# Patient Record
Sex: FEMALE | Race: BLACK OR AFRICAN AMERICAN | Employment: PART TIME | URBAN - METROPOLITAN AREA
[De-identification: names, ages, dates, MRNs, and addresses within clinical notes are randomized per-mention and may not be internally consistent; named-entity substitution may affect disease eponyms.]

---

## 2022-11-21 ENCOUNTER — OFFICE VISIT (OUTPATIENT)
Dept: PRIMARY CARE CLINIC | Age: 27
End: 2022-11-21
Payer: COMMERCIAL

## 2022-11-21 VITALS
SYSTOLIC BLOOD PRESSURE: 122 MMHG | HEIGHT: 69 IN | DIASTOLIC BLOOD PRESSURE: 74 MMHG | HEART RATE: 109 BPM | BODY MASS INDEX: 30.42 KG/M2 | WEIGHT: 205.4 LBS | OXYGEN SATURATION: 99 %

## 2022-11-21 DIAGNOSIS — Z20.2 POSSIBLE EXPOSURE TO STD: ICD-10-CM

## 2022-11-21 DIAGNOSIS — Z00.00 ANNUAL PHYSICAL EXAM: Primary | ICD-10-CM

## 2022-11-21 DIAGNOSIS — L68.0 HIRSUTISM: ICD-10-CM

## 2022-11-21 DIAGNOSIS — Z72.51 UNPROTECTED SEX: ICD-10-CM

## 2022-11-21 DIAGNOSIS — E55.9 VITAMIN D DEFICIENCY: ICD-10-CM

## 2022-11-21 DIAGNOSIS — F32.A DEPRESSION, UNSPECIFIED DEPRESSION TYPE: ICD-10-CM

## 2022-11-21 DIAGNOSIS — N89.8 VAGINAL DISCHARGE: ICD-10-CM

## 2022-11-21 DIAGNOSIS — B96.89 BACTERIAL VAGINITIS: ICD-10-CM

## 2022-11-21 DIAGNOSIS — G43.019 INTRACTABLE MIGRAINE WITHOUT AURA AND WITHOUT STATUS MIGRAINOSUS: ICD-10-CM

## 2022-11-21 DIAGNOSIS — N76.0 BACTERIAL VAGINITIS: ICD-10-CM

## 2022-11-21 DIAGNOSIS — Z00.00 ANNUAL PHYSICAL EXAM: ICD-10-CM

## 2022-11-21 DIAGNOSIS — F12.90 MARIJUANA SMOKER: ICD-10-CM

## 2022-11-21 DIAGNOSIS — B37.31 VAGINAL CANDIDIASIS: ICD-10-CM

## 2022-11-21 PROBLEM — G43.009 MIGRAINE WITHOUT AURA: Status: ACTIVE | Noted: 2022-11-21

## 2022-11-21 PROBLEM — E66.09 OBESITY DUE TO EXCESS CALORIES: Status: RESOLVED | Noted: 2017-01-04 | Resolved: 2022-11-21

## 2022-11-21 PROBLEM — E66.09 OBESITY DUE TO EXCESS CALORIES: Status: ACTIVE | Noted: 2017-01-04

## 2022-11-21 LAB
A/G RATIO: 1.7 (ref 1.1–2.2)
ALBUMIN SERPL-MCNC: 4.4 G/DL (ref 3.4–5)
ALP BLD-CCNC: 70 U/L (ref 40–129)
ALT SERPL-CCNC: 8 U/L (ref 10–40)
ANION GAP SERPL CALCULATED.3IONS-SCNC: 14 MMOL/L (ref 3–16)
AST SERPL-CCNC: 14 U/L (ref 15–37)
BASOPHILS ABSOLUTE: 0 K/UL (ref 0–0.2)
BASOPHILS RELATIVE PERCENT: 0.5 %
BILIRUB SERPL-MCNC: 0.8 MG/DL (ref 0–1)
BUN BLDV-MCNC: 10 MG/DL (ref 7–20)
CALCIUM SERPL-MCNC: 9.7 MG/DL (ref 8.3–10.6)
CHLORIDE BLD-SCNC: 103 MMOL/L (ref 99–110)
CHOLESTEROL, TOTAL: 156 MG/DL (ref 0–199)
CO2: 21 MMOL/L (ref 21–32)
CREAT SERPL-MCNC: 0.6 MG/DL (ref 0.6–1.1)
EOSINOPHILS ABSOLUTE: 0 K/UL (ref 0–0.6)
EOSINOPHILS RELATIVE PERCENT: 0.4 %
ESTRADIOL LEVEL: 124 PG/ML
GFR SERPL CREATININE-BSD FRML MDRD: >60 ML/MIN/{1.73_M2}
GLUCOSE BLD-MCNC: 79 MG/DL (ref 70–99)
GONADOTROPIN, CHORIONIC (HCG) QUANT: <5 MIU/ML
HAV IGM SER IA-ACNC: NORMAL
HCT VFR BLD CALC: 38.2 % (ref 36–48)
HDLC SERPL-MCNC: 47 MG/DL (ref 40–60)
HEMOGLOBIN: 12.4 G/DL (ref 12–16)
HEPATITIS B CORE IGM ANTIBODY: NORMAL
HEPATITIS B SURFACE ANTIGEN INTERPRETATION: NORMAL
HEPATITIS C ANTIBODY INTERPRETATION: NORMAL
LDL CHOLESTEROL CALCULATED: 99 MG/DL
LYMPHOCYTES ABSOLUTE: 1.9 K/UL (ref 1–5.1)
LYMPHOCYTES RELATIVE PERCENT: 22.5 %
MCH RBC QN AUTO: 27 PG (ref 26–34)
MCHC RBC AUTO-ENTMCNC: 32.6 G/DL (ref 31–36)
MCV RBC AUTO: 83 FL (ref 80–100)
MONOCYTES ABSOLUTE: 0.4 K/UL (ref 0–1.3)
MONOCYTES RELATIVE PERCENT: 4.9 %
NEUTROPHILS ABSOLUTE: 6.2 K/UL (ref 1.7–7.7)
NEUTROPHILS RELATIVE PERCENT: 71.7 %
PDW BLD-RTO: 13.8 % (ref 12.4–15.4)
PLATELET # BLD: 252 K/UL (ref 135–450)
PMV BLD AUTO: 9.5 FL (ref 5–10.5)
POTASSIUM SERPL-SCNC: 3.8 MMOL/L (ref 3.5–5.1)
RBC # BLD: 4.6 M/UL (ref 4–5.2)
SODIUM BLD-SCNC: 138 MMOL/L (ref 136–145)
TOTAL PROTEIN: 7 G/DL (ref 6.4–8.2)
TRIGL SERPL-MCNC: 50 MG/DL (ref 0–150)
TSH REFLEX FT4: 0.93 UIU/ML (ref 0.27–4.2)
VITAMIN D 25-HYDROXY: 26.2 NG/ML
VLDLC SERPL CALC-MCNC: 10 MG/DL
WBC # BLD: 8.6 K/UL (ref 4–11)

## 2022-11-21 PROCEDURE — 99385 PREV VISIT NEW AGE 18-39: CPT | Performed by: FAMILY MEDICINE

## 2022-11-21 RX ORDER — ACETAMINOPHEN 325 MG/1
TABLET ORAL
COMMUNITY
End: 2022-11-21

## 2022-11-21 RX ORDER — FLUCONAZOLE 150 MG/1
150 TABLET ORAL
COMMUNITY
End: 2022-11-21

## 2022-11-21 RX ORDER — ERGOCALCIFEROL (VITAMIN D2) 1250 MCG
50000 CAPSULE ORAL WEEKLY
COMMUNITY
Start: 2020-10-16 | End: 2022-11-21

## 2022-11-21 RX ORDER — AMITRIPTYLINE HYDROCHLORIDE 25 MG/1
25 TABLET, FILM COATED ORAL
COMMUNITY
Start: 2017-01-04 | End: 2022-11-21

## 2022-11-21 RX ORDER — AMOXICILLIN AND CLAVULANATE POTASSIUM 875; 125 MG/1; MG/1
125 TABLET, FILM COATED ORAL
COMMUNITY
Start: 2022-09-30 | End: 2022-11-21

## 2022-11-21 RX ORDER — DROSPIRENONE AND ETHINYL ESTRADIOL 0.02-3(28)
3 KIT ORAL
COMMUNITY
End: 2022-11-21

## 2022-11-21 RX ORDER — NAPROXEN 500 MG/1
500 TABLET ORAL
COMMUNITY
End: 2022-11-21

## 2022-11-21 RX ORDER — METRONIDAZOLE 500 MG/1
500 TABLET ORAL
COMMUNITY
Start: 2022-08-26 | End: 2022-11-21

## 2022-11-21 RX ORDER — AMOXICILLIN AND CLAVULANATE POTASSIUM 875; 125 MG/1; MG/1
1 TABLET, FILM COATED ORAL 2 TIMES DAILY
COMMUNITY
End: 2022-11-21

## 2022-11-21 RX ORDER — IBUPROFEN 800 MG/1
800 TABLET ORAL EVERY 8 HOURS PRN
Qty: 30 TABLET | Refills: 2 | Status: SHIPPED | OUTPATIENT
Start: 2022-11-21

## 2022-11-21 RX ORDER — AMOXICILLIN 500 MG/1
500 CAPSULE ORAL
COMMUNITY
Start: 2022-10-25 | End: 2022-11-21

## 2022-11-21 SDOH — ECONOMIC STABILITY: FOOD INSECURITY: WITHIN THE PAST 12 MONTHS, YOU WORRIED THAT YOUR FOOD WOULD RUN OUT BEFORE YOU GOT MONEY TO BUY MORE.: SOMETIMES TRUE

## 2022-11-21 SDOH — ECONOMIC STABILITY: FOOD INSECURITY: WITHIN THE PAST 12 MONTHS, THE FOOD YOU BOUGHT JUST DIDN'T LAST AND YOU DIDN'T HAVE MONEY TO GET MORE.: SOMETIMES TRUE

## 2022-11-21 ASSESSMENT — PATIENT HEALTH QUESTIONNAIRE - PHQ9
2. FEELING DOWN, DEPRESSED OR HOPELESS: 3
8. MOVING OR SPEAKING SO SLOWLY THAT OTHER PEOPLE COULD HAVE NOTICED. OR THE OPPOSITE, BEING SO FIGETY OR RESTLESS THAT YOU HAVE BEEN MOVING AROUND A LOT MORE THAN USUAL: 3
10. IF YOU CHECKED OFF ANY PROBLEMS, HOW DIFFICULT HAVE THESE PROBLEMS MADE IT FOR YOU TO DO YOUR WORK, TAKE CARE OF THINGS AT HOME, OR GET ALONG WITH OTHER PEOPLE: 1
1. LITTLE INTEREST OR PLEASURE IN DOING THINGS: 3
9. THOUGHTS THAT YOU WOULD BE BETTER OFF DEAD, OR OF HURTING YOURSELF: 1
7. TROUBLE CONCENTRATING ON THINGS, SUCH AS READING THE NEWSPAPER OR WATCHING TELEVISION: 3
SUM OF ALL RESPONSES TO PHQ QUESTIONS 1-9: 23
5. POOR APPETITE OR OVEREATING: 3
4. FEELING TIRED OR HAVING LITTLE ENERGY: 3
SUM OF ALL RESPONSES TO PHQ QUESTIONS 1-9: 22
SUM OF ALL RESPONSES TO PHQ QUESTIONS 1-9: 23
6. FEELING BAD ABOUT YOURSELF - OR THAT YOU ARE A FAILURE OR HAVE LET YOURSELF OR YOUR FAMILY DOWN: 3
SUM OF ALL RESPONSES TO PHQ QUESTIONS 1-9: 23
3. TROUBLE FALLING OR STAYING ASLEEP: 1
SUM OF ALL RESPONSES TO PHQ9 QUESTIONS 1 & 2: 6

## 2022-11-21 ASSESSMENT — ENCOUNTER SYMPTOMS
TROUBLE SWALLOWING: 0
SHORTNESS OF BREATH: 0
EYE DISCHARGE: 0
COUGH: 0
VOMITING: 0
SORE THROAT: 0
NAUSEA: 0
ABDOMINAL PAIN: 0
EYE ITCHING: 0
DIARRHEA: 0

## 2022-11-21 ASSESSMENT — COLUMBIA-SUICIDE SEVERITY RATING SCALE - C-SSRS
2. HAVE YOU ACTUALLY HAD ANY THOUGHTS OF KILLING YOURSELF?: NO
1. WITHIN THE PAST MONTH, HAVE YOU WISHED YOU WERE DEAD OR WISHED YOU COULD GO TO SLEEP AND NOT WAKE UP?: YES
6. HAVE YOU EVER DONE ANYTHING, STARTED TO DO ANYTHING, OR PREPARED TO DO ANYTHING TO END YOUR LIFE?: NO

## 2022-11-21 ASSESSMENT — SOCIAL DETERMINANTS OF HEALTH (SDOH): HOW HARD IS IT FOR YOU TO PAY FOR THE VERY BASICS LIKE FOOD, HOUSING, MEDICAL CARE, AND HEATING?: SOMEWHAT HARD

## 2022-11-21 NOTE — PROGRESS NOTES
2022    Heena Whittaker (:  1995) is a 24 y.o. female, here for a preventive medicine evaluation. Patient Active Problem List   Diagnosis    Migraine without aura    Vitamin D deficiency    Hirsutism    Depression    Marijuana smoker    Unprotected sex       Establish Care  - was in [place]  - has an OB/Gyn but not a primary    Depression  Anxiety? Marijuana Use  - was dx with depression, about   - was at a facility - [facility] for 14 days, in  - seen a therapist and was told that she could not help her  - feels that marijuana might help with her depression  - had some s/e - hand felt numb  - currently not on any medication  - was on elavil - not current  - might have been \"zoloft\" - tried 2 more - when she was in [place] - she was also seeing a psychiatrist   - works at a  - infant/babies, helps her \"makes me feel better\"  - griffiths snot want to be on any medications - concerned for possible s/e  - willing to see Dr Charlie Hart   - denies any suicidal ideations at this time      Migraine  - takes ibuprofen which helps    GI symptoms  Acid Reflex  - feels that  it might be due to anxiety  - GERD \"suns in the family\"    Vit D Def  -ran out of this medication  - OB was filing    Hirsutism  - after she had her daughter   -Regular menstrual cycle  -Does not feel that she has PCOS  -However wants her hormones checked    Review of Systems   Constitutional:  Negative for appetite change, chills, fatigue and fever. HENT:  Negative for congestion, ear pain, sneezing, sore throat and trouble swallowing. Eyes:  Negative for discharge and itching. Respiratory:  Negative for cough and shortness of breath. Cardiovascular:  Negative for chest pain and leg swelling. Gastrointestinal:  Negative for abdominal pain, diarrhea, nausea and vomiting. Genitourinary:  Positive for vaginal discharge. Negative for dysuria and urgency. Musculoskeletal:  Negative for joint swelling and neck pain.    Neurological: Positive for headaches (Ibuprofen help). Negative for light-headedness. Psychiatric/Behavioral:  Positive for dysphoric mood (Marijuana help). The patient is not nervous/anxious. Prior to Visit Medications    Medication Sig Taking? Authorizing Provider   ibuprofen (ADVIL;MOTRIN) 800 MG tablet Take 1 tablet by mouth every 8 hours as needed for Pain (headaches) Yes Ashley Smith MD        No Known Allergies    Past Medical History:   Diagnosis Date    Depression        History reviewed. No pertinent surgical history.     Social History     Socioeconomic History    Marital status: Single     Spouse name: Not on file    Number of children: Not on file    Years of education: Not on file    Highest education level: Not on file   Occupational History    Not on file   Tobacco Use    Smoking status: Never    Smokeless tobacco: Never   Vaping Use    Vaping Use: Never used   Substance and Sexual Activity    Alcohol use: Never    Drug use: Yes     Types: Marijuana Bonnie Jose)    Sexual activity: Yes     Partners: Male   Other Topics Concern    Not on file   Social History Narrative    Not on file     Social Determinants of Health     Financial Resource Strain: Medium Risk    Difficulty of Paying Living Expenses: Somewhat hard   Food Insecurity: Food Insecurity Present    Worried About Running Out of Food in the Last Year: Sometimes true    Ran Out of Food in the Last Year: Sometimes true   Transportation Needs: Not on file   Physical Activity: Not on file   Stress: Not on file   Social Connections: Not on file   Intimate Partner Violence: Not on file   Housing Stability: Not on file        Family History   Problem Relation Age of Onset    No Known Problems Mother     No Known Problems Father     Alzheimer's Disease Maternal Grandmother     Cancer Maternal Aunt         Breast Cancer    Breast Cancer Maternal Aunt        ADVANCE DIRECTIVE: N, <no information>    Vitals:    11/21/22 0936   BP: 122/74   Site: Right Lower Arm Position: Sitting   Cuff Size: Medium Adult   Pulse: (!) 109   SpO2: 99%   Weight: 205 lb 6.4 oz (93.2 kg)   Height: 5' 9.02\" (1.753 m)     Estimated body mass index is 30.32 kg/m² as calculated from the following:    Height as of this encounter: 5' 9.02\" (1.753 m). Weight as of this encounter: 205 lb 6.4 oz (93.2 kg). Physical Exam  Constitutional:       General: She is not in acute distress. Appearance: Normal appearance. HENT:      Head: Normocephalic and atraumatic. Right Ear: External ear normal.      Left Ear: External ear normal.      Nose: Nose normal. No congestion or rhinorrhea. Eyes:      General:         Right eye: No discharge. Left eye: No discharge. Extraocular Movements: Extraocular movements intact. Conjunctiva/sclera: Conjunctivae normal.   Cardiovascular:      Rate and Rhythm: Normal rate and regular rhythm. Heart sounds: Normal heart sounds. No murmur heard. Pulmonary:      Effort: Pulmonary effort is normal.      Breath sounds: Normal breath sounds. No wheezing. Chest:      Chest wall: No tenderness. Abdominal:      General: Bowel sounds are normal. There is no distension. Musculoskeletal:         General: No swelling or tenderness. Normal range of motion. Cervical back: Normal range of motion and neck supple. Skin:     General: Skin is warm and dry. Neurological:      General: No focal deficit present. Mental Status: She is alert and oriented to person, place, and time. Psychiatric:         Mood and Affect: Mood normal.         Behavior: Behavior normal.       No flowsheet data found. No results found for: CHOL, CHOLFAST, TRIG, TRIGLYCFAST, HDL, LDLCHOLESTEROL, LDLCALC, GLUF, GLUCOSE, LABA1C    The ASCVD Risk score (Veena DK, et al., 2019) failed to calculate for the following reasons:     The 2019 ASCVD risk score is only valid for ages 36 to 78    Immunization History   Administered Date(s) Administered    DTaP (Infanrix) 1995, 01/16/1998, 04/18/1998, 02/09/1999, 03/15/2002, 06/24/2009    HIB PRP-T (ActHIB, Hiberix) 1995, 01/16/1998, 04/13/1998, 09/16/1998    HPV Quadrivalent (Gardasil) 01/04/2017    Hepatitis A Ped/Adol (Havrix, Vaqta) 09/29/2005, 06/24/2009    Hepatitis B [birth year], 07/07/1998    Influenza Virus Vaccine 02/15/2013, 01/17/2018, 10/09/2019, 10/14/2020    Influenza, FLUMIST, (age 2-51 y), Live, Intranasal, 0.2mL 01/15/2014, 12/24/2014    MMR 09/16/1998, 03/15/2002, 05/10/2017    Meningococcal MCV4O (Menveo) 06/24/2009, 08/15/2014    Meningococcal MCV4P (Menactra) 10/09/2019    Polio IPV (IPOL) 1995, 01/16/1998, 09/16/1998, 09/29/2005    Tdap (Boostrix, Adacel) 06/04/2012, 12/24/2014    Varicella (Varivax) 09/16/1998, 09/03/2012       Health Maintenance   Topic Date Due    COVID-19 Vaccine (1) Never done    Depression Monitoring  Never done    HIV screen  Never done    Hepatitis C screen  Never done    HPV vaccine (2 - 3-dose series) 02/01/2017    Pap smear  Never done    Flu vaccine (1) 08/01/2022    DTaP/Tdap/Td vaccine (9 - Td or Tdap) 12/24/2024    Hepatitis A vaccine  Completed    Hib vaccine  Completed    Varicella vaccine  Completed    Meningococcal (ACWY) vaccine  Completed    Pneumococcal 0-64 years Vaccine  Aged Out       Assessment & Plan   Annual physical exam  -     Comprehensive Metabolic Panel; Future  -     CBC with Auto Differential; Future  -     LIPID PANEL; Future  Depression, unspecified depression type  Assessment & Plan:    uses marijuana, declines any medications at this time, agreeable to seeing psychologist  Orders:  -     TSH with Reflex to FT4; Future  Vitamin D deficiency  Assessment & Plan:    check vitamin D levels, prescribed based on results  Orders:  -     Vitamin D 25 Hydroxy;  Future  Marijuana smoker  Assessment & Plan:    counseled regarding this, patient feels that it helps her with her \"depression\"  Intractable migraine without aura and without status migrainosus  Assessment & Plan:   Well-controlled, continue current medications, as needed ibuprofen  Possible exposure to STD  -     RPR TITER; Future  -     C.trachomatis N.gonorrhoeae DNA, Urine; Future  -     VAGINAL PATHOGENS PROBE *A  -     Hepatitis Panel, Acute; Future  Vaginal discharge  -     RPR TITER; Future  -     C.trachomatis N.gonorrhoeae DNA, Urine; Future  -     VAGINAL PATHOGENS PROBE *A  -     HCG, QUANTITATIVE, PREGNANCY; Future  -     Hepatitis Panel, Acute; Future  Hirsutism  Assessment & Plan:    check hormone levels  Orders:  -     Testosterone, free, total; Future  -     Estradiol; Future  Unprotected sex  Assessment & Plan:    counseled, STD testing and also wants pregnancy test  Orders:  -     HCG, QUANTITATIVE, PREGNANCY; Future  -     Hepatitis Panel, Acute; Future  Return in about 3 months (around 2/21/2023), or if symptoms worsen or fail to improve, for depression.          --Alycia Duenas MD

## 2022-11-22 LAB
CANDIDA SPECIES, DNA PROBE: ABNORMAL
GARDNERELLA VAGINALIS, DNA PROBE: ABNORMAL
TOTAL SYPHILLIS IGG/IGM: NORMAL
TRICHOMONAS VAGINALIS DNA: ABNORMAL

## 2022-11-22 RX ORDER — METRONIDAZOLE 500 MG/1
500 TABLET ORAL 2 TIMES DAILY
Qty: 14 TABLET | Refills: 0 | Status: SHIPPED | OUTPATIENT
Start: 2022-11-22 | End: 2022-11-29

## 2022-11-22 RX ORDER — CHOLECALCIFEROL (VITAMIN D3) 50 MCG
2000 TABLET ORAL DAILY
Qty: 30 TABLET | Refills: 3 | Status: SHIPPED | OUTPATIENT
Start: 2022-11-22

## 2022-11-22 RX ORDER — FLUCONAZOLE 150 MG/1
150 TABLET ORAL ONCE
Qty: 1 TABLET | Refills: 0 | Status: SHIPPED | OUTPATIENT
Start: 2022-11-22 | End: 2022-11-22

## 2022-11-23 LAB
SEX HORMONE BINDING GLOBULIN: 63 NMOL/L (ref 30–135)
TESTOSTERONE FREE-NONMALE: 4.4 PG/ML (ref 0.8–7.4)
TESTOSTERONE TOTAL: 38 NG/DL (ref 20–70)

## 2022-11-24 LAB
C. TRACHOMATIS DNA ,URINE: NEGATIVE
N. GONORRHOEAE DNA, URINE: NEGATIVE

## 2023-02-24 ENCOUNTER — OFFICE VISIT (OUTPATIENT)
Dept: PRIMARY CARE CLINIC | Age: 28
End: 2023-02-24
Payer: COMMERCIAL

## 2023-02-24 VITALS
BODY MASS INDEX: 30.81 KG/M2 | HEART RATE: 63 BPM | WEIGHT: 208 LBS | HEIGHT: 69 IN | RESPIRATION RATE: 20 BRPM | OXYGEN SATURATION: 99 % | TEMPERATURE: 98.3 F | DIASTOLIC BLOOD PRESSURE: 76 MMHG | SYSTOLIC BLOOD PRESSURE: 106 MMHG

## 2023-02-24 DIAGNOSIS — E55.9 VITAMIN D DEFICIENCY: ICD-10-CM

## 2023-02-24 DIAGNOSIS — N89.8 VAGINAL DISCHARGE: ICD-10-CM

## 2023-02-24 DIAGNOSIS — Z12.4 CERVICAL CANCER SCREENING: Primary | ICD-10-CM

## 2023-02-24 DIAGNOSIS — B37.9 YEAST INFECTION: ICD-10-CM

## 2023-02-24 DIAGNOSIS — Z72.51 UNPROTECTED SEX: ICD-10-CM

## 2023-02-24 DIAGNOSIS — Z12.4 CERVICAL CANCER SCREENING: ICD-10-CM

## 2023-02-24 LAB
HAV IGM SER IA-ACNC: NORMAL
HEPATITIS B CORE IGM ANTIBODY: NORMAL
HEPATITIS B SURFACE ANTIGEN INTERPRETATION: NORMAL
HEPATITIS C ANTIBODY INTERPRETATION: NORMAL

## 2023-02-24 PROCEDURE — 99214 OFFICE O/P EST MOD 30 MIN: CPT | Performed by: FAMILY MEDICINE

## 2023-02-24 RX ORDER — FLUCONAZOLE 150 MG/1
150 TABLET ORAL ONCE
Qty: 1 TABLET | Refills: 0 | Status: SHIPPED | OUTPATIENT
Start: 2023-02-24 | End: 2023-02-24

## 2023-02-24 RX ORDER — CHOLECALCIFEROL (VITAMIN D3) 50 MCG
2000 TABLET ORAL DAILY
Qty: 30 TABLET | Refills: 3 | Status: SHIPPED | OUTPATIENT
Start: 2023-02-24

## 2023-02-24 ASSESSMENT — ENCOUNTER SYMPTOMS
SORE THROAT: 0
EYE ITCHING: 0
DIARRHEA: 0
SHORTNESS OF BREATH: 0
VOMITING: 0
ABDOMINAL PAIN: 0
EYE DISCHARGE: 0
NAUSEA: 0
COUGH: 0
TROUBLE SWALLOWING: 0

## 2023-02-24 ASSESSMENT — PATIENT HEALTH QUESTIONNAIRE - PHQ9
7. TROUBLE CONCENTRATING ON THINGS, SUCH AS READING THE NEWSPAPER OR WATCHING TELEVISION: 3
SUM OF ALL RESPONSES TO PHQ QUESTIONS 1-9: 15
8. MOVING OR SPEAKING SO SLOWLY THAT OTHER PEOPLE COULD HAVE NOTICED. OR THE OPPOSITE, BEING SO FIGETY OR RESTLESS THAT YOU HAVE BEEN MOVING AROUND A LOT MORE THAN USUAL: 1
SUM OF ALL RESPONSES TO PHQ QUESTIONS 1-9: 15
4. FEELING TIRED OR HAVING LITTLE ENERGY: 3
6. FEELING BAD ABOUT YOURSELF - OR THAT YOU ARE A FAILURE OR HAVE LET YOURSELF OR YOUR FAMILY DOWN: 2
10. IF YOU CHECKED OFF ANY PROBLEMS, HOW DIFFICULT HAVE THESE PROBLEMS MADE IT FOR YOU TO DO YOUR WORK, TAKE CARE OF THINGS AT HOME, OR GET ALONG WITH OTHER PEOPLE: 2
SUM OF ALL RESPONSES TO PHQ QUESTIONS 1-9: 15
SUM OF ALL RESPONSES TO PHQ9 QUESTIONS 1 & 2: 3
5. POOR APPETITE OR OVEREATING: 2
2. FEELING DOWN, DEPRESSED OR HOPELESS: 2
3. TROUBLE FALLING OR STAYING ASLEEP: 1
SUM OF ALL RESPONSES TO PHQ QUESTIONS 1-9: 15
9. THOUGHTS THAT YOU WOULD BE BETTER OFF DEAD, OR OF HURTING YOURSELF: 0
1. LITTLE INTEREST OR PLEASURE IN DOING THINGS: 1

## 2023-02-24 NOTE — PROGRESS NOTES
Noemi Ley (:  1995) is a 24 y.o. female,Established patient, here for evaluation of the following chief complaint(s):  Follow-up (Std testing . Blood test for HIV)      ASSESSMENT/PLAN:  1. Cervical cancer screening  -     PAP SMEAR  -     C.trachomatis N.gonorrhoeae DNA, Thin Prep; Future  2. Vaginal discharge  -     VAGINAL PATHOGENS PROBE *A  -     HIV Screen; Future  -     Hepatitis Panel, Acute; Future  -     Syphilis Antibody Cascading Reflex; Future  -     PAP SMEAR  -     C.trachomatis N.gonorrhoeae DNA, Thin Prep; Future  3. Unprotected sex  -     VAGINAL PATHOGENS PROBE *A  -     HIV Screen; Future  -     Hepatitis Panel, Acute; Future  -     Syphilis Antibody Cascading Reflex; Future  -     PAP SMEAR  -     C.trachomatis N.gonorrhoeae DNA, Thin Prep; Future  4. Yeast infection  -     fluconazole (DIFLUCAN) 150 MG tablet; Take 1 tablet by mouth once for 1 dose, Disp-1 tablet, R-0Normal  5. Vitamin D deficiency  -     vitamin D (CHOLECALCIFEROL) 50 MCG (2000 UT) TABS tablet; Take 1 tablet by mouth daily, Disp-30 tablet, R-3Normal      Return if symptoms worsen or fail to improve. SUBJECTIVE/OBJECTIVE:  HPI    Patient presents for STD testing  Vaginal discharge  Unprotected sex  -Patient was tested in 2022 but reported that since then they had \"unprotected sex\" the last time around and wants to make sure that she is \"okay\"  -Complains of white discharge    Cervical cancer screen  -Here for Pap smear today  -History of BV   -Vaginal discharge today  -Has 1 partner but sometimes has unprotected sex          Review of Systems   Constitutional:  Negative for appetite change, chills, fatigue and fever. HENT:  Negative for congestion, ear pain, sneezing, sore throat and trouble swallowing. Eyes:  Negative for discharge and itching. Respiratory:  Negative for cough and shortness of breath. Cardiovascular:  Negative for chest pain and leg swelling.    Gastrointestinal:  Negative for abdominal pain, diarrhea, nausea and vomiting. Genitourinary:  Positive for vaginal discharge. Negative for dysuria and urgency. Musculoskeletal:  Negative for joint swelling and neck pain. Neurological:  Negative for light-headedness and headaches. Psychiatric/Behavioral:  Negative for dysphoric mood. The patient is not nervous/anxious. Physical Exam  Constitutional:       General: She is not in acute distress. Appearance: Normal appearance. HENT:      Head: Normocephalic and atraumatic. Nose: Nose normal. No congestion or rhinorrhea. Eyes:      General:         Right eye: No discharge. Left eye: No discharge. Extraocular Movements: Extraocular movements intact. Conjunctiva/sclera: Conjunctivae normal.   Cardiovascular:      Rate and Rhythm: Normal rate and regular rhythm. Heart sounds: Normal heart sounds. No murmur heard. Pulmonary:      Effort: Pulmonary effort is normal.      Breath sounds: Normal breath sounds. No wheezing. Genitourinary:     General: Normal vulva. Vagina: Vaginal discharge (White discharge) present. Comments: No obvious lesions or papules of the vaginal area  Musculoskeletal:         General: No swelling or tenderness. Normal range of motion. Cervical back: Normal range of motion and neck supple. Skin:     General: Skin is warm and dry. Neurological:      Mental Status: She is alert and oriented to person, place, and time. Psychiatric:         Mood and Affect: Mood normal.         Behavior: Behavior normal.         An electronic signature was used to authenticate this note.     --Calvin Landry MD

## 2023-02-25 LAB
CANDIDA SPECIES, DNA PROBE: ABNORMAL
GARDNERELLA VAGINALIS, DNA PROBE: ABNORMAL
HIV AG/AB: NORMAL
HIV ANTIGEN: NORMAL
HIV-1 ANTIBODY: NORMAL
HIV-2 AB: NORMAL
TOTAL SYPHILLIS IGG/IGM: NORMAL
TRICHOMONAS VAGINALIS DNA: ABNORMAL

## 2023-03-03 ENCOUNTER — PATIENT MESSAGE (OUTPATIENT)
Dept: PRIMARY CARE CLINIC | Age: 28
End: 2023-03-03

## 2023-03-03 DIAGNOSIS — R87.612 LOW GRADE SQUAMOUS INTRAEPITH LESION ON CYTOLOGIC SMEAR CERVIX (LGSIL): ICD-10-CM

## 2023-03-03 DIAGNOSIS — R87.821 VAGINAL LOW RISK HPV DNA TEST POSITIVE: Primary | ICD-10-CM

## 2023-03-03 NOTE — TELEPHONE ENCOUNTER
From: Tabby Ga  To: Dr. Martinez Records  Sent: 3/3/2023 8:03 AM EST  Subject: Question regarding PAP SMEAR    Is the HPV a STI?

## 2023-04-02 PROBLEM — E66.9 CLASS 1 OBESITY WITH BODY MASS INDEX (BMI) OF 30.0 TO 30.9 IN ADULT: Status: ACTIVE | Noted: 2023-04-02

## 2023-04-03 ENCOUNTER — OFFICE VISIT (OUTPATIENT)
Dept: ENDOCRINOLOGY | Age: 28
End: 2023-04-03
Payer: COMMERCIAL

## 2023-04-03 VITALS
TEMPERATURE: 98 F | DIASTOLIC BLOOD PRESSURE: 76 MMHG | SYSTOLIC BLOOD PRESSURE: 114 MMHG | WEIGHT: 208 LBS | HEART RATE: 67 BPM | OXYGEN SATURATION: 97 % | HEIGHT: 69 IN | RESPIRATION RATE: 14 BRPM | BODY MASS INDEX: 30.81 KG/M2

## 2023-04-03 DIAGNOSIS — E28.8 HYPERANDROGENISM: Primary | ICD-10-CM

## 2023-04-03 DIAGNOSIS — E04.9 GOITER: ICD-10-CM

## 2023-04-03 DIAGNOSIS — L68.0 HIRSUTISM: ICD-10-CM

## 2023-04-03 DIAGNOSIS — E66.9 CLASS 1 OBESITY WITH BODY MASS INDEX (BMI) OF 30.0 TO 30.9 IN ADULT, UNSPECIFIED OBESITY TYPE, UNSPECIFIED WHETHER SERIOUS COMORBIDITY PRESENT: ICD-10-CM

## 2023-04-03 PROCEDURE — 99204 OFFICE O/P NEW MOD 45 MIN: CPT | Performed by: INTERNAL MEDICINE

## 2023-04-03 NOTE — PROGRESS NOTES
10:38 AM     Lab Results   Component Value Date/Time    TSHFT4 0.93 11/21/2022 10:38 AM     No results found for: LABA1C  No results found for: EAG  Lab Results   Component Value Date/Time    CHOL 156 11/21/2022 10:38 AM     Lab Results   Component Value Date/Time    TRIG 50 11/21/2022 10:38 AM     Lab Results   Component Value Date/Time    HDL 47 11/21/2022 10:38 AM     Lab Results   Component Value Date/Time    LDLCALC 99 11/21/2022 10:38 AM     Lab Results   Component Value Date/Time    LABVLDL 10 11/21/2022 10:38 AM     No results found for: CHOLHDLRATIO  No results found for: LABMICR, [id number]  Lab Results   Component Value Date/Time    VITD25 26.2 11/21/2022 10:38 AM        ASSESSMENT/PLAN:  1. Hirsutism/hyperandrogenism  Obtain lab work, then reevaluate  - Prolactin; Future  - ACTH; Future  - Cortisol AM, Total; Future  - DHEA-Sulfate; Future  - Testosterone, free, total; Future  - 17-Hydroxyprogesterone; Future  - Insulin, total; Future  - Salivary Cortisol; Future  - Salivary Cortisol; Future  - Salivary Cortisol; Future  - Insulin, total; Future    2. Class 1 obesity with body mass index (BMI) of 30.0 to 30.9 in adult, unspecified obesity type, unspecified whether serious comorbidity present  Diet, exercise    3. Goiter  Obtain thyroid sonogram  - US HEAD NECK SOFT TISSUE THYROID; Future  - T3, Free; Future  - T4, Free; Future  - TSH; Future  - Anti-Thyroglobulin Antibody; Future  - Thyroid Peroxidase Antibody; Future  - Iodine, Serum;  Future      Reviewed and/or ordered clinical lab results Yes  Reviewed and/or ordered radiology tests Yes   Reviewed and/or ordered other diagnostic tests No  Discussed test results with performing physician No  Independently reviewed image, tracing, or specimen No  Made a decision to obtain old records No  Reviewed and summarized old records Yes  TSH was 0.93  25-hydroxy vitamin D 26.2  Calcium 9.7  Testosterone 38  Free testosterone 4.4  Estradiol 124  FSH 8.6  LH

## 2023-04-28 DIAGNOSIS — E55.9 VITAMIN D DEFICIENCY: ICD-10-CM

## 2023-05-01 ENCOUNTER — TELEPHONE (OUTPATIENT)
Dept: ENDOCRINOLOGY | Age: 28
End: 2023-05-01

## 2023-05-01 RX ORDER — CHOLECALCIFEROL (VITAMIN D3) 50 MCG
2000 TABLET ORAL DAILY
Qty: 30 TABLET | Refills: 3 | Status: SHIPPED | OUTPATIENT
Start: 2023-05-01

## 2023-05-01 NOTE — TELEPHONE ENCOUNTER
Please ask patient if she can come for appointment at 9:50 instead of 10:50 on May 12, 2022. There is cancellation at 9:50. Thank you.

## 2023-05-01 NOTE — TELEPHONE ENCOUNTER
----- Message from Mendoza Hernandez MD sent at 11/14/2018  3:45 PM CST -----  Please update patient with normal CT chest lung screening.   Medication:   Requested Prescriptions     Pending Prescriptions Disp Refills    vitamin D (CHOLECALCIFEROL) 50 MCG (2000 UT) TABS tablet 30 tablet 3     Sig: Take 1 tablet by mouth daily     Last Filled:  02/24/2023    Last appt: 2/24/2023   Next appt: Visit date not found    Last OARRS: No flowsheet data found.

## 2023-05-12 ENCOUNTER — OFFICE VISIT (OUTPATIENT)
Dept: ENDOCRINOLOGY | Age: 28
End: 2023-05-12
Payer: COMMERCIAL

## 2023-05-12 VITALS
WEIGHT: 204 LBS | OXYGEN SATURATION: 98 % | DIASTOLIC BLOOD PRESSURE: 70 MMHG | SYSTOLIC BLOOD PRESSURE: 98 MMHG | TEMPERATURE: 98 F | BODY MASS INDEX: 30.21 KG/M2 | HEIGHT: 69 IN | RESPIRATION RATE: 14 BRPM | HEART RATE: 70 BPM

## 2023-05-12 DIAGNOSIS — E04.9 GOITER: ICD-10-CM

## 2023-05-12 DIAGNOSIS — L68.0 HIRSUTISM: Primary | ICD-10-CM

## 2023-05-12 DIAGNOSIS — E66.9 CLASS 1 OBESITY WITH BODY MASS INDEX (BMI) OF 30.0 TO 30.9 IN ADULT, UNSPECIFIED OBESITY TYPE, UNSPECIFIED WHETHER SERIOUS COMORBIDITY PRESENT: ICD-10-CM

## 2023-05-12 DIAGNOSIS — E28.8 HYPERANDROGENISM: ICD-10-CM

## 2023-05-12 PROCEDURE — 99214 OFFICE O/P EST MOD 30 MIN: CPT | Performed by: INTERNAL MEDICINE

## 2023-05-12 NOTE — PROGRESS NOTES
SUBJECTIVE:  Bindu Benson is a 24 y.o. female who is being evaluated for hirsutism, hyperandrogenism. 1. Hirsutism/ Hyperandrogenism  This started in 2018. Patient was diagnosed with hirsutism. The problem has been gradually worsening. Patient started medication in N/A. Currently patient is on: N/A. Misses  N/A doses a month. Current complaints: fatigue, abnormal hair growth on the face and chest  After delivery of her daughter she developed hair on her face and chest, stomach. Gradually increased. Waxes the face. Patient shaves hair on the chest.  Every 1.5 weeks. No change in voice, no hair loss, no acne. Has lower sex drive. No new medications    Tried Nuvaring  Had blood clots. Patient believes that here growth worsened after she was started on NuvaRing. Nevere improved. No weight gain. No muscle weakness. Has migraines. Has migraine history. Had anxiety, but medications made her sleepy  Periods regular. Painful. Delay only 1-2 days. No difficulty getting pregnant. Started periods at age 18    1. Class 1 obesity with body mass index (BMI) of 30.0 to 30.9 in adult, unspecified obesity type, unspecified whether serious comorbidity present  He is healthy, active  Lost 20-30 lbs on diet. Walks. 3. Goiter  History of obstructive symptoms: difficulty swallowing No, changes in voice/hoarseness No.  History of radiation to patient's neck: No  Resent iodine exposure: No  Family history includes no thyroid abnormalities.   Family history of thyroid cancer: No    Past Medical History:   Diagnosis Date    Depression     Hirsutism      Patient Active Problem List    Diagnosis Date Noted    Migraine without aura 11/21/2022    Hirsutism 11/21/2022    Depression 11/21/2022    Marijuana smoker 11/21/2022    Unprotected sex 11/21/2022    Vitamin D deficiency 10/16/2020    Hyperandrogenism 04/03/2023    Goiter 04/03/2023    Class 1 obesity with body mass index (BMI) of 30.0 to 30.9 in adult

## 2023-06-08 DIAGNOSIS — E55.9 VITAMIN D DEFICIENCY: ICD-10-CM

## 2023-06-08 RX ORDER — CHOLECALCIFEROL (VITAMIN D3) 50 MCG
2000 TABLET ORAL DAILY
Qty: 30 TABLET | Refills: 3 | OUTPATIENT
Start: 2023-06-08